# Patient Record
Sex: FEMALE | Race: WHITE | NOT HISPANIC OR LATINO | Employment: FULL TIME
[De-identification: names, ages, dates, MRNs, and addresses within clinical notes are randomized per-mention and may not be internally consistent; named-entity substitution may affect disease eponyms.]

---

## 2017-06-15 ENCOUNTER — CHARTING TRANS (OUTPATIENT)
Dept: OTHER | Age: 37
End: 2017-06-15

## 2017-06-26 ENCOUNTER — IMAGING SERVICES (OUTPATIENT)
Dept: OTHER | Age: 37
End: 2017-06-26

## 2018-02-27 PROBLEM — R22.9 MULTIPLE SKIN NODULES: Status: ACTIVE | Noted: 2018-02-27

## 2018-02-27 PROBLEM — M08.80 JIA (JUVENILE IDIOPATHIC ARTHRITIS) (HCC): Status: ACTIVE | Noted: 2018-02-27

## 2018-02-27 PROCEDURE — 87340 HEPATITIS B SURFACE AG IA: CPT | Performed by: INTERNAL MEDICINE

## 2018-02-27 PROCEDURE — 86200 CCP ANTIBODY: CPT | Performed by: INTERNAL MEDICINE

## 2018-02-27 PROCEDURE — 86706 HEP B SURFACE ANTIBODY: CPT | Performed by: INTERNAL MEDICINE

## 2018-02-27 PROCEDURE — 86704 HEP B CORE ANTIBODY TOTAL: CPT | Performed by: INTERNAL MEDICINE

## 2018-02-27 PROCEDURE — 86803 HEPATITIS C AB TEST: CPT | Performed by: INTERNAL MEDICINE

## 2018-02-27 PROCEDURE — 86480 TB TEST CELL IMMUN MEASURE: CPT | Performed by: INTERNAL MEDICINE

## 2018-07-16 PROBLEM — M06.30 RHEUMATOID NODULES (HCC): Status: ACTIVE | Noted: 2018-07-16

## 2018-11-28 ENCOUNTER — CHARTING TRANS (OUTPATIENT)
Dept: OTHER | Age: 38
End: 2018-11-28

## 2018-11-28 ASSESSMENT — PAIN SCALES - GENERAL: PAINLEVEL_OUTOF10: 8

## 2018-12-04 VITALS
TEMPERATURE: 96.2 F | DIASTOLIC BLOOD PRESSURE: 78 MMHG | RESPIRATION RATE: 16 BRPM | HEART RATE: 64 BPM | SYSTOLIC BLOOD PRESSURE: 110 MMHG | OXYGEN SATURATION: 100 %

## 2018-12-30 ENCOUNTER — WALK IN (OUTPATIENT)
Dept: URGENT CARE | Age: 38
End: 2018-12-30

## 2018-12-30 DIAGNOSIS — J32.9 SINUSITIS, UNSPECIFIED CHRONICITY, UNSPECIFIED LOCATION: Primary | ICD-10-CM

## 2018-12-30 PROCEDURE — 99214 OFFICE O/P EST MOD 30 MIN: CPT | Performed by: FAMILY MEDICINE

## 2018-12-30 RX ORDER — SULFASALAZINE 500 MG/1
TABLET, DELAYED RELEASE ORAL
COMMUNITY
Start: 2018-07-16

## 2018-12-30 RX ORDER — AMOXICILLIN 875 MG/1
875 TABLET, COATED ORAL 2 TIMES DAILY
Qty: 20 TABLET | Refills: 0 | Status: SHIPPED | OUTPATIENT
Start: 2018-12-30 | End: 2019-01-09

## 2018-12-30 RX ORDER — NORETHINDRONE ACETATE AND ETHINYL ESTRADIOL 1MG-20(21)
KIT ORAL
COMMUNITY
Start: 2018-11-27

## 2018-12-30 RX ORDER — PROPRANOLOL HYDROCHLORIDE 10 MG/1
10 TABLET ORAL
COMMUNITY

## 2018-12-30 RX ORDER — ESCITALOPRAM OXALATE 10 MG/1
TABLET ORAL
COMMUNITY
Start: 2018-10-29

## 2018-12-30 ASSESSMENT — ENCOUNTER SYMPTOMS
HEADACHES: 1
SORE THROAT: 1

## 2018-12-30 ASSESSMENT — PAIN SCALES - GENERAL: PAINLEVEL: 5-6

## 2019-03-11 PROCEDURE — 88175 CYTOPATH C/V AUTO FLUID REDO: CPT | Performed by: OBSTETRICS & GYNECOLOGY

## 2019-03-11 PROCEDURE — 87624 HPV HI-RISK TYP POOLED RSLT: CPT | Performed by: OBSTETRICS & GYNECOLOGY

## 2020-10-01 ENCOUNTER — TELEPHONE (OUTPATIENT)
Dept: ORTHOPEDICS CLINIC | Facility: CLINIC | Age: 40
End: 2020-10-01

## 2020-10-01 NOTE — TELEPHONE ENCOUNTER
Per insurance pre cert/prior auth for the following codes 08813 and [id number] is not required by insurance call reference call [id number]

## 2020-10-08 ENCOUNTER — HOSPITAL ENCOUNTER (OUTPATIENT)
Dept: GENERAL RADIOLOGY | Facility: HOSPITAL | Age: 40
Discharge: HOME | End: 2020-10-08
Attending: PODIATRIST
Payer: COMMERCIAL

## 2020-10-08 ENCOUNTER — OFFICE VISIT (OUTPATIENT)
Dept: ORTHOPEDICS CLINIC | Facility: CLINIC | Age: 40
End: 2020-10-08
Payer: COMMERCIAL

## 2020-10-08 DIAGNOSIS — Z09 POSTOP CHECK: Primary | ICD-10-CM

## 2020-10-08 DIAGNOSIS — Z09 POSTOP CHECK: ICD-10-CM

## 2020-10-08 PROBLEM — M06.9 RHEUMATOID ARTHRITIS (HCC): Status: ACTIVE | Noted: 2018-07-30

## 2020-10-08 PROCEDURE — 99024 POSTOP FOLLOW-UP VISIT: CPT | Performed by: PODIATRIST

## 2020-10-08 PROCEDURE — 73630 X-RAY EXAM OF FOOT: CPT | Performed by: PODIATRIST

## 2020-10-08 PROCEDURE — L4387 NON-PNEUM WALK BOOT PRE OTS: HCPCS | Performed by: PODIATRIST

## 2020-10-08 RX ORDER — SULFASALAZINE 500 MG/1
1000 TABLET, DELAYED RELEASE ORAL
COMMUNITY
End: 2020-10-23

## 2020-10-08 RX ORDER — CEPHALEXIN 500 MG/1
CAPSULE ORAL
COMMUNITY
Start: 2020-10-07 | End: 2020-10-23

## 2020-10-08 RX ORDER — ESCITALOPRAM OXALATE 10 MG/1
10 TABLET ORAL DAILY
COMMUNITY
Start: 2020-10-03 | End: 2020-10-23

## 2020-10-08 RX ORDER — ESCITALOPRAM OXALATE 10 MG/1
10 TABLET ORAL DAILY
COMMUNITY
End: 2020-10-23

## 2020-10-08 NOTE — PROGRESS NOTES
[facility] Progress Note    Subjective: Pt here for 3 days post op check excision nodules and tailors bunion osteotomy right foot. Feeling a little nausea today. Did start oral keflex yesterday.   Has stopped norco. States 5th toe has been \"spazz

## 2020-10-12 ENCOUNTER — TELEPHONE (OUTPATIENT)
Dept: ORTHOPEDICS CLINIC | Facility: CLINIC | Age: 40
End: 2020-10-12

## 2020-10-23 ENCOUNTER — HOSPITAL ENCOUNTER (OUTPATIENT)
Dept: GENERAL RADIOLOGY | Facility: HOSPITAL | Age: 40
Discharge: HOME | End: 2020-10-23
Attending: PODIATRIST
Payer: COMMERCIAL

## 2020-10-23 ENCOUNTER — OFFICE VISIT (OUTPATIENT)
Dept: ORTHOPEDICS CLINIC | Facility: CLINIC | Age: 40
End: 2020-10-23
Payer: COMMERCIAL

## 2020-10-23 DIAGNOSIS — M21.621 TAILOR'S BUNION OF RIGHT FOOT: ICD-10-CM

## 2020-10-23 DIAGNOSIS — Z98.890 STATUS POST SURGERY: ICD-10-CM

## 2020-10-23 DIAGNOSIS — R22.41 FOOT MASS, RIGHT: Primary | ICD-10-CM

## 2020-10-23 PROCEDURE — 99024 POSTOP FOLLOW-UP VISIT: CPT | Performed by: PODIATRIST

## 2020-10-23 PROCEDURE — 73630 X-RAY EXAM OF FOOT: CPT | Performed by: PODIATRIST

## 2020-10-23 NOTE — PROGRESS NOTES
[facility] Progress Note    Subjective: Patient returns for follow-up almost 3 weeks post tailor's bunion surgery right foot and excision of soft tissue nodules consistent with rheumatoid arthritis nodules.   She is doing well she has been in the p PAST SURGICAL HISTORY:  No significant past surgical history

## 2020-11-12 ENCOUNTER — TELEPHONE (OUTPATIENT)
Dept: ORTHOPEDICS CLINIC | Facility: CLINIC | Age: 40
End: 2020-11-12

## 2020-11-13 ENCOUNTER — OFFICE VISIT (OUTPATIENT)
Dept: ORTHOPEDICS CLINIC | Facility: CLINIC | Age: 40
End: 2020-11-13
Payer: COMMERCIAL

## 2020-11-13 DIAGNOSIS — M21.621 TAILOR'S BUNION OF RIGHT FOOT: Primary | ICD-10-CM

## 2020-11-13 DIAGNOSIS — M71.30 SYNOVIAL CYST: ICD-10-CM

## 2020-11-13 PROCEDURE — 99024 POSTOP FOLLOW-UP VISIT: CPT | Performed by: PODIATRIST

## 2020-11-13 NOTE — PROGRESS NOTES
[facility] Progress Note    Subjective: She returns now 6 weeks post surgery for tailor's bunion right foot, and excision of rheumatoid nodules or synovial cysts right foot as well she is doing well she has been in the low-profile boot comfortably

## 2021-05-25 VITALS
DIASTOLIC BLOOD PRESSURE: 80 MMHG | HEART RATE: 92 BPM | RESPIRATION RATE: 16 BRPM | SYSTOLIC BLOOD PRESSURE: 120 MMHG | OXYGEN SATURATION: 98 % | TEMPERATURE: 97 F

## 2021-12-30 ENCOUNTER — WALK IN (OUTPATIENT)
Dept: URGENT CARE | Age: 41
End: 2021-12-30

## 2021-12-30 VITALS
DIASTOLIC BLOOD PRESSURE: 71 MMHG | SYSTOLIC BLOOD PRESSURE: 118 MMHG | OXYGEN SATURATION: 97 % | TEMPERATURE: 100.1 F | HEART RATE: 114 BPM | RESPIRATION RATE: 18 BRPM

## 2021-12-30 DIAGNOSIS — J06.9 ACUTE URI: Primary | ICD-10-CM

## 2021-12-30 DIAGNOSIS — R68.89 FLU-LIKE SYMPTOMS: ICD-10-CM

## 2021-12-30 LAB
FLUAV AG UPPER RESP QL IA.RAPID: NEGATIVE
FLUBV AG UPPER RESP QL IA.RAPID: NEGATIVE

## 2021-12-30 PROCEDURE — 99213 OFFICE O/P EST LOW 20 MIN: CPT | Performed by: FAMILY MEDICINE

## 2021-12-30 PROCEDURE — 87804 INFLUENZA ASSAY W/OPTIC: CPT | Performed by: FAMILY MEDICINE

## 2024-06-18 ENCOUNTER — WALK IN (OUTPATIENT)
Dept: URGENT CARE | Age: 44
End: 2024-06-18

## 2024-06-18 VITALS
SYSTOLIC BLOOD PRESSURE: 122 MMHG | DIASTOLIC BLOOD PRESSURE: 78 MMHG | HEART RATE: 70 BPM | RESPIRATION RATE: 14 BRPM | TEMPERATURE: 98.5 F | OXYGEN SATURATION: 100 %

## 2024-06-18 DIAGNOSIS — H10.502 BLEPHAROCONJUNCTIVITIS OF LEFT EYE, UNSPECIFIED BLEPHAROCONJUNCTIVITIS TYPE: Primary | ICD-10-CM

## 2024-06-18 PROCEDURE — 99214 OFFICE O/P EST MOD 30 MIN: CPT | Performed by: FAMILY MEDICINE

## 2024-06-18 RX ORDER — TOBRAMYCIN 3 MG/ML
SOLUTION/ DROPS OPHTHALMIC
Qty: 1 EACH | Refills: 0 | Status: SHIPPED | OUTPATIENT
Start: 2024-06-18

## 2024-06-18 RX ORDER — FLUOXETINE 20 MG/1
1 TABLET, FILM COATED ORAL DAILY
COMMUNITY
Start: 2024-04-11

## 2025-02-13 ENCOUNTER — HOSPITAL ENCOUNTER (OUTPATIENT)
Dept: GENERAL RADIOLOGY | Age: 45
Discharge: HOME | End: 2025-02-13
Attending: PODIATRIST
Payer: COMMERCIAL

## 2025-02-13 ENCOUNTER — OFFICE VISIT (OUTPATIENT)
Dept: ORTHOPEDICS CLINIC | Facility: CLINIC | Age: 45
End: 2025-02-13
Payer: COMMERCIAL

## 2025-02-13 VITALS — BODY MASS INDEX: 26.58 KG/M2 | HEIGHT: 63 IN | WEIGHT: 150 LBS

## 2025-02-13 DIAGNOSIS — M77.31 CALCANEAL SPUR OF RIGHT FOOT: ICD-10-CM

## 2025-02-13 DIAGNOSIS — M79.671 RIGHT FOOT PAIN: Primary | ICD-10-CM

## 2025-02-13 DIAGNOSIS — M79.671 RIGHT FOOT PAIN: ICD-10-CM

## 2025-02-13 DIAGNOSIS — M76.60 INSERTIONAL ACHILLES TENDINOPATHY: ICD-10-CM

## 2025-02-13 DIAGNOSIS — M06.30 RHEUMATOID NODULES (HCC): ICD-10-CM

## 2025-02-13 DIAGNOSIS — M06.9 RHEUMATOID ARTHRITIS, INVOLVING UNSPECIFIED SITE, UNSPECIFIED WHETHER RHEUMATOID FACTOR PRESENT (HCC): ICD-10-CM

## 2025-02-13 DIAGNOSIS — Z96.9 PRESENCE OF RETAINED HARDWARE: ICD-10-CM

## 2025-02-13 PROCEDURE — 3008F BODY MASS INDEX DOCD: CPT | Performed by: PODIATRIST

## 2025-02-13 PROCEDURE — 73630 X-RAY EXAM OF FOOT: CPT | Performed by: PODIATRIST

## 2025-02-13 PROCEDURE — 99204 OFFICE O/P NEW MOD 45 MIN: CPT | Performed by: PODIATRIST

## 2025-02-13 RX ORDER — MELOXICAM 7.5 MG/1
7.5 TABLET ORAL DAILY
Qty: 14 TABLET | Refills: 0 | Status: SHIPPED | OUTPATIENT
Start: 2025-02-13

## 2025-02-13 NOTE — PROGRESS NOTES
[facility] New Patient Note    CC:   Chief Complaint   Patient presents with    Follow - Up     Right foot bump on the back of her heel  Onset: about 6 months ago        HPI: The patient is a 44 year old female who presents today with complaints of a bump on back of her right heel for about 6 months.  No injury.  Did not hit that area or injure  History of soft tissue nodules from RA  I had surgery perhaps 5 to 6 years ago for multiple nodules of that right foot and also tailor's bunion procedure with screw fixation.  She has done well with that and she is here for a new issue back of the heel.  She is wondering if that in fact is a nodule or something else.  It can be really painful and it does help to have heels on  Currently on sulfasalazine for RA  Overall she has been pretty free of nodules      Past Medical History:    IUD (intrauterine device) in place    mirena    DANIEL (juvenile idiopathic arthritis) ([facility])    OTHER DISEASES    Psoriasis-resolved    OTHER DISEASES    childhood arthritis-?JRA vs psoriatic arthritis    OTHER DISEASES    polycystic ovarian disease     Past Surgical History:   Procedure Laterality Date    Appendectomy            Foot surgery Right 10/05/2020    Other surgical history      hand surgery x2- ganglion and trigger finger     Current Outpatient Medications   Medication Sig Dispense Refill    sertraline (ZOLOFT) 50 MG Oral Tab Take 1 tablet (50 mg total) by mouth daily. 90 tablet 0    Levonorgestrel (MIRENA, 52 MG,) 20 MCG/24HR Intrauterine IUD 20 mcg (1 each total) by Intrauterine route once.      sulfaSALAzine 500 MG Oral Tab EC Take 2 tablets (1,000 mg total) by mouth 2 (two) times daily. 360 tablet 1     Allergies[1]  Family History   Problem Relation Age of Onset    Other (Other) Father         psoriasis     Social History     Occupational History    Not on file   Tobacco Use    Smoking status: Never    Smokeless tobacco: Never   Vaping Use    Vaping status:  Never Used   Substance and Sexual Activity    Alcohol use: Yes     Comment: social     Drug use: No    Sexual activity: Yes     Partners: Male     Birth control/protection: I.U.D.        ROS:  Complete ROS reviewed by me and non-contributory to the chief complaint except as mentioned above.    Physical Exam:    Ht 5' 3\" (1.6 m)   Wt 150 lb (68 kg)   BMI 26.57 kg/m²       Right LE exam  No calf pain.  Tight tendoachilles  Pain at insertional region achilles posterior calcaneus.  Mild swelling and bursitis  No pain side to side compression of heel  Screw palpable non tender 5th metatarsal head  Sensation is intact sharp versus dull.  She can feel light touch to the tips of the toes  Palpable pedal pulses.  Hair growth is present.  Skin is supple and feet are well perfused and warm.    Strength is 5 out of 5 all muscle groups    Full range of motion and nonpainful motion of the ankle joint, subtalar joint, MP joints, and midfoot joints.       Imaging:  small posterior calcaneal spur.  ST swelling.   Retained screw x2 5th metatarsal   personally viewed, independently interpreted and radiology report read.      Assessment/Diagnoses:  Diagnoses and all orders for this visit:    Right foot pain  -     XR FOOT WEIGHTBEARING (3 VIEWS), RIGHT   (CPT=73630); Future    Rheumatoid nodules (HCC)    Rheumatoid arthritis, involving unspecified site, unspecified whether rheumatoid factor present (HCC)    Presence of retained hardware    Insertional Achilles tendinopathy    Calcaneal spur of right foot        Plan:  I reviewed imaging and exam findings with the patient.  We looked at her x-rays together and discussed the findings of the very small spur.  She has symptoms of insertional Achilles tendon nidus and also bursitis.  This is in relation to the spur.  She also has a history of the nodules and this could be involved as well      Topicals ie ice, heat, voltaren gel, lidocaine patches  Short course of meloxicam may be more  effective than otc motrin  Rx -take with food in am, dc if any side effects ie GI upset    We discussed shoes and types  Arch support, heel lift/cushions    Rx for soft tissue Ultrasound study  See Dr Curry for US study and injection if he thinks it helpful  We are looking for nodule versus insertional tendon issue vs bursitis ect          Tracie Seay, DPMPodiatric Surgery  FACFAS  [facility]  [address], [address], [place] [address] [address] [place] [address] [email or web address]  [email or web address]  t: [phone]   f: [phone]              This document was partially prepared using Dragon Medical voice recognition software.            [1] No Known Allergies

## 2025-02-19 ENCOUNTER — OFFICE VISIT (OUTPATIENT)
Facility: CLINIC | Age: 45
End: 2025-02-19
Payer: COMMERCIAL

## 2025-02-19 VITALS — BODY MASS INDEX: 26.58 KG/M2 | HEIGHT: 63 IN | WEIGHT: 150 LBS

## 2025-02-19 DIAGNOSIS — R93.89 ABNORMAL ULTRASOUND: ICD-10-CM

## 2025-02-19 DIAGNOSIS — M76.60 INSERTIONAL ACHILLES TENDINOPATHY: Primary | ICD-10-CM

## 2025-02-19 DIAGNOSIS — M77.31 CALCANEAL SPUR OF RIGHT FOOT: ICD-10-CM

## 2025-02-19 PROCEDURE — 99204 OFFICE O/P NEW MOD 45 MIN: CPT | Performed by: FAMILY MEDICINE

## 2025-02-19 NOTE — PROGRESS NOTES
Sports Medicine Clinic Note    Subjective:    Chief Complaint: Right heel pain and swelling    History: 44-year-old female presents for evaluation of a painful bump at the posterior aspect of the right heel. Symptoms have been ongoing for approximately six months without a known injury. She has a history of rheumatoid nodules and underwent prior foot surgery on the right foot, including excision of multiple nodules and a tailor's bunion correction with screw fixation approximately 5-6 years ago. She is concerned about whether the current mass represents a recurrent rheumatoid nodule or another pathology. Reports pain that is alleviated to some extent by wearing heels. Currently taking sulfasalazine for RA. No other significant new musculoskeletal complaints.    Objective:    Body mass index is 26.57 kg/m².    Right Heel Examination:    Inspection: Mild swelling at the posterior heel. No erythema or skin breakdown. No overt fluctuance or signs of infection.  Palpation: Tenderness localized to the posterior calcaneus and retrocalcaneal region. No tenderness with medial-lateral compression of the heel. Palpable hardware at the lateral foot without associated tenderness.  Range of Motion: Full, non-painful range of motion of the ankle, subtalar joint, MP joints, and midfoot joints.  Neurovascular: Sensation intact to sharp/dull testing and light touch. Pedal pulses palpable bilaterally. Adequate capillary refill. Normal hair growth and skin perfusion.  Special Tests: No instability noted. No pain with passive ankle dorsiflexion beyond tenderness at the posterior heel.    Diagnostic Tests:    Radiographs (2/13/25): Small posterior calcaneal spur noted with associated soft tissue swelling. Retained screws at the 5th metatarsal without signs of loosening or irritation. No acute fractures or dislocations.    - - -    Point of Care Ultrasound:    Probe Selection: High-frequency linear transducer.    Findings:    Examination  revealed an enlarged retrocalcaneal bursa with hypoechoic fluid collection and mild increased vascularity on Doppler assessment, consistent with active retrocalcaneal bursitis.  A prominent posterosuperior calcaneal bony prominence was observed at the Achilles tendon insertion, suggestive of Juma’s deformity.  The Achilles tendon appeared structurally intact, with normal fibrillar architecture and no evidence of full-thickness or high-grade partial tearing. No significant intratendinous calcifications or marked tendinopathy were noted.  There were no well-defined hypoechoic or heterogeneous nodular masses detected in the scanned region to suggest a recurrent rheumatoid nodule.  No abnormal fluid collections or hypervascular soft tissue masses concerning for infection or malignancy were identified.    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 No evidence of a recurrent rheumatoid nodule or significant Achilles tendon tear was identified.    - - -    Assessment:    Juma’s deformity with associated retrocalcaneal bursitis  Findings not consistent with recurrent rheumatoid nodule    Plan:    Procedures: Diagnostic ultrasound performed today, confirming bursitis and bony prominence consistent with Juma’s deformity.  Medications: Continue meloxicam for pain and inflammation. Consider short courses of NSAIDs for flare-ups.  Bracing/Orthotics: Recommend heel lift or cushioned heel pads to offload pressure on the posterior heel.  Therapy: Encourage Achilles stretching and eccentric strengthening exercises.  Activity Recommendations: Avoid excessive barefoot walking or rigid-backed shoes that aggravate symptoms. Encourage well-cushioned footwear with a slightly elevated heel.    Follow-Up: Tentatively scheduled in 3-4 weeks to assess symptom improvement.      Robert Curry DO, CAQSM   Primary Care Sports Medicine

## 2025-02-24 ENCOUNTER — PATIENT MESSAGE (OUTPATIENT)
Dept: ORTHOPEDICS CLINIC | Facility: CLINIC | Age: 45
End: 2025-02-24

## 2025-02-24 NOTE — TELEPHONE ENCOUNTER
Patient is asking for your thoughts on the US done by Dr Curry 2/19/25. Do you need her to come into clinic?  Please advise.  Thank you!    LOV  2/19/25  Dr Curry  US    Probe Selection: High-frequency linear transducer.    Findings:    Examination revealed an enlarged retrocalcaneal bursa with hypoechoic fluid collection and mild increased vascularity on Doppler assessment, consistent with active retrocalcaneal bursitis.  A prominent posterosuperior calcaneal bony prominence was observed at the Achilles tendon insertion, suggestive of Juma’s deformity.  The Achilles tendon appeared structurally intact, with normal fibrillar architecture and no evidence of full-thickness or high-grade partial tearing. No significant intratendinous calcifications or marked tendinopathy were noted.  There were no well-defined hypoechoic or heterogeneous nodular masses detected in the scanned region to suggest a recurrent rheumatoid nodule.  No abnormal fluid collections or hypervascular soft tissue masses concerning for infection or malignancy were identified.     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 No evidence of a recurrent rheumatoid nodule or significant Achilles tendon tear was

## 2025-02-24 NOTE — TELEPHONE ENCOUNTER
Tracie Seay, DPM to [facility] Clinical Pool        2/24/25  3:21 PM  Yes, i reviewed  We could try an injection if she would like to follow up  It is not an RA nodule or tendonitis  It is bursitis